# Patient Record
Sex: MALE | Race: WHITE | URBAN - METROPOLITAN AREA
[De-identification: names, ages, dates, MRNs, and addresses within clinical notes are randomized per-mention and may not be internally consistent; named-entity substitution may affect disease eponyms.]

---

## 2017-12-28 ENCOUNTER — HOSPITAL ENCOUNTER (EMERGENCY)
Facility: CLINIC | Age: 27
Discharge: HOME | End: 2017-12-28
Attending: PHYSICIAN ASSISTANT | Admitting: PHYSICIAN ASSISTANT
Payer: COMMERCIAL

## 2017-12-28 ENCOUNTER — APPOINTMENT (OUTPATIENT)
Dept: GENERAL RADIOLOGY | Facility: CLINIC | Age: 27
End: 2017-12-28
Attending: PHYSICIAN ASSISTANT
Payer: COMMERCIAL

## 2017-12-28 VITALS
TEMPERATURE: 98 F | SYSTOLIC BLOOD PRESSURE: 149 MMHG | OXYGEN SATURATION: 99 % | DIASTOLIC BLOOD PRESSURE: 90 MMHG | RESPIRATION RATE: 16 BRPM

## 2017-12-28 DIAGNOSIS — M25.561 ACUTE PAIN OF RIGHT KNEE: Primary | ICD-10-CM

## 2017-12-28 PROCEDURE — 99213 OFFICE O/P EST LOW 20 MIN: CPT | Performed by: PHYSICIAN ASSISTANT

## 2017-12-28 PROCEDURE — 73560 X-RAY EXAM OF KNEE 1 OR 2: CPT | Mod: RT

## 2017-12-28 PROCEDURE — 99213 OFFICE O/P EST LOW 20 MIN: CPT

## 2017-12-28 NOTE — ED PROVIDER NOTES
"  History     Chief Complaint   Patient presents with     Knee Pain     right knee     HPI  Ismael Laurent is a 27 year old male who presents with complaints of right knee pain over the past 2 days.  His knee pain is worse with weightbearing and flexing his knee.  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  Patient reports that he originally injured this knee many years ago and has had persistent intermittent pain since that time.  He reports experiencing frequent locking, catching, and giving out of this knee.  He has not noticed any overlying skin changes.  He has been taking Aleve and ibuprofen at home and occasionally wears a hinged knee brace.  Patient cannot recall ever having any imaging of this knee but reports that he has seen an orthopedist in the past that offered to surgically evaluate his knee however patient did not have health insurance at that time so never pursued this option.      Problem List:    There are no active problems to display for this patient.       Past Medical History:    History reviewed. No pertinent past medical history.    Past Surgical History:    History reviewed. No pertinent surgical history.    Family History:    No family history on file.    Social History:  Marital Status:   [2]  Social History   Substance Use Topics     Smoking status: Current Every Day Smoker     Smokeless tobacco: Never Used     Alcohol use Yes        Medications:      BuPROPion HCl (WELLBUTRIN PO)         Review of Systems   Constitutional: Negative.    Musculoskeletal:        Right knee pain   Skin: Negative.    Neurological: Negative.    All other systems reviewed and are negative.      Physical Exam   BP: 149/90  Heart Rate: 85  Temp: 98  F (36.7  C)  Resp: 16  SpO2: 99 %      Physical Exam " "  Constitutional: He appears well-developed and well-nourished. No distress.   HENT:   Head: Normocephalic and atraumatic.   Pulmonary/Chest: Effort normal.   Musculoskeletal:        Right knee: He exhibits effusion. He exhibits normal range of motion, no ecchymosis, no deformity, no erythema, no LCL laxity and no MCL laxity. Tenderness found. Medial joint line and MCL tenderness noted. No lateral joint line, no LCL and no patellar tendon tenderness noted.   Neurological: He is alert. He has normal strength. No sensory deficit.   Skin: Skin is warm and dry.       ED Course     ED Course     Procedures     Results for orders placed or performed during the hospital encounter of 12/28/17   Knee XR, 2 view, right    Narrative    RIGHT KNEE ONE-TWO VIEWS   12/28/2017 4:42 PM     INDICATION: Medial joint line tenderness.    COMPARISON: 6/3/2010.      Impression    IMPRESSION: No fractures or other acute findings.    ANTON KOVACS MD       Assessments & Plan (with Medical Decision Making)     Pt is a 27 year old male who presents with complaints of right knee pain over the past 2 days.  His knee pain is worse with weightbearing and flexing his knee.  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  Patient reports that he originally injured this knee many years ago and has had persistent intermittent pain since that time.  He reports experiencing frequent locking, catching, and giving out of this knee.  Pt is afebrile on arrival.  Exam as above.  X-rays of right knee were negative for fractures or acute pathology.  Discussed results with patient.  Discussed that patient may have a meniscal injury that has been intermittently giving him pain over these years.  Encouraged follow-up with orthopedics.  Encouraged rest, ice, compression, " and elevation as well as NSAID use for pain and inflammation.  Hand-outs provided.    Patient was instructed to follow-up with orthopedics if no improvement in 5-7 days for continued care and management or sooner if new or worsening symptoms.  He is to return to the ED for persistent and/or worsening symptoms.  Patient expressed understanding of the diagnosis and plan and was discharged home in good condition.    I have reviewed the nursing notes.    I have reviewed the findings, diagnosis, plan and need for follow up with the patient.    Discharge Medication List as of 12/28/2017  5:10 PM          Final diagnoses:   Acute pain of right knee       12/28/2017   [facility] EMERGENCY DEPARTMENT     Oanh Randle PA-C  12/30/17 2022

## 2017-12-28 NOTE — ED AVS SNAPSHOT
[facility] Emergency Department    [address] [address]    Phone:  [phone]    Fax:  [phone]                                       Ismael Laurent   MRN: [id number]    Department:  [facility] Emergency Department   Date of Visit:  12/28/2017           Patient Information     Date Of Birth          1990        Your diagnoses for this visit were:     Acute pain of right knee        You were seen by Oanh Randle PA-C.      Follow-up Information     Call [facility].    Specialty:  Orthopedics and Sports Medicine    Why:  For follow-up    Contact information:    [address] [place] [address]  [phone]    Additional information:    The medical center is located at   [address] [place] [address] (between [address] in [place], four miles north   of [place]).        Follow up with [facility] Emergency Department.    Specialty:  EMERGENCY MEDICINE    Why:  As needed, If symptoms worsen    Contact information:    [address] [facility] [address] [facility] [address] [phone]    Additional information:    The medical center is located at   [address] [place] [address] (between [address]  [place], four miles north   of [place]).      Discharge References/Attachments     KNEE PAIN WITH POSSIBLE TORN MENISCUS (ENGLISH)    MENISCAL TEARS, UNDERSTANDING (ENGLISH)      24 Hour Appointment Hotline       To make an appointment at any [facility] clinic, call [phone] ([phone]). If you don't have a family doctor or clinic, we will help you find one. [facility] are conveniently located to serve the needs of you and your family.          ED Discharge Orders     ORTHOPEDICS ADULT REFERRAL       Your provider has referred you to: [facility]: [facility] [place] [facility] [phone]    [email or web address]    Please be aware that coverage of these services is subject to the terms and limitations of your health insurance plan.  Call member services at your health plan with any benefit or coverage questions.      Please bring the following to your appointment:    >>   Any x-rays, CTs or MRIs which have been performed.  Contact the facility where they were done to arrange for  prior to your scheduled appointment.    >>   List of current medications   >>   This referral request   >>   Any documents/labs given to you for this referral                     Review of your medicines      Our records show that you are taking the medicines listed below. If these are incorrect, please call your family doctor or clinic.        Dose / Directions Last dose taken    WELLBUTRIN PO        Refills:  0                Procedures and tests performed during your visit     Knee XR, 2 view, right      Orders Needing Specimen Collection     None      Pending Results     Date and Time Order Name Status Description    12/28/2017 1623 Knee XR, 2 view, right Preliminary             Pending Culture Results     No orders found from 12/26/2017 to 12/29/2017.            Pending Results Instructions     If you had any lab results that were not finalized at the time of your Discharge, you can call the ED Lab Result RN at [phone]. You will be contacted by this team for any positive Lab results or changes in treatment. The nurses are available 7 days a week from 10A to 6:30P.  You can leave a message 24 hours per day and they will return your call.        Test Results From Your Hospital Stay        12/28/2017  4:53 PM      Narrative     RIGHT KNEE ONE-TWO VIEWS   12/28/2017 4:42 PM     INDICATION: Medial joint line tenderness.    COMPARISON: 6/3/2010.        Impression     IMPRESSION: No fractures or other acute findings.                Thank you for choosing [facility]       Thank  "you for choosing [facility] for your care. Our goal is always to provide you with excellent care. Hearing back from our patients is one way we can continue to improve our services. Please take a few minutes to complete the written survey that you may receive in the mail after you visit with us. Thank you!        [employer] Information     [employer] lets you send messages to your doctor, view your test results, renew your prescriptions, schedule appointments and more. To sign up, go to [email or web address] . Click on \"Log in\" on the left side of the screen, which will take you to the Welcome page. Then click on \"Sign up Now\" on the right side of the page.     You will be asked to enter the access code listed below, as well as some personal information. Please follow the directions to create your username and password.     Your access code is: [email or web address]  Expires: 3/28/2018  5:10 PM     Your access code will  in 90 days. If you need help or a new code, please call your [facility] clinic or [phone].        Care EveryWhere ID     This is your Care EveryWhere ID. This could be used by other organizations to access your [facility] medical records  [id number]        Equal Access to Services     DOMINGUEZ GEORGE : Tegan Gonzalez, wablaiseda maria esther, qaybta kaalmada adeana paula, dat horn. [facility] [phone].    ATENCIÓN: Si habla español, tiene a michelle disposición servicios gratuitos de asistencia lingüística. Llame al [phone].    We comply with applicable federal civil rights laws and [place] laws. We do not discriminate on the basis of race, color, national origin, age, disability, sex, sexual orientation, or gender identity.            After Visit Summary       This is your record. Keep this with you and show to your community pharmacist(s) and doctor(s) at your next visit.                  "

## 2017-12-28 NOTE — ED NOTES
Patient here for pain in the R knee, symptoms ongoing - worse today.  Patient presents ambualtory to the urgent care.

## 2017-12-28 NOTE — ED AVS SNAPSHOT
[facility] Emergency Department    [address] [facility] [facility] [address]    Phone:  [phone]    Fax:  [phone]                                       Ismael Laurent   MRN: [id number]    Department:  [facility] Emergency Department   Date of Visit:  12/28/2017           After Visit Summary Signature Page     I have received my discharge instructions, and my questions have been answered. I have discussed any challenges I see with this plan with the nurse or doctor.    ..........................................................................................................................................  Patient/Patient Representative Signature      ..........................................................................................................................................  Patient Representative Print Name and Relationship to Patient    ..................................................               ................................................  Date                                            Time    ..........................................................................................................................................  Reviewed by Signature/Title    ...................................................              ..............................................  Date                                                            Time

## 2017-12-30 ASSESSMENT — ENCOUNTER SYMPTOMS
NEUROLOGICAL NEGATIVE: 1
CONSTITUTIONAL NEGATIVE: 1